# Patient Record
Sex: MALE | Race: WHITE | Employment: OTHER | URBAN - NONMETROPOLITAN AREA
[De-identification: names, ages, dates, MRNs, and addresses within clinical notes are randomized per-mention and may not be internally consistent; named-entity substitution may affect disease eponyms.]

---

## 2017-01-02 ENCOUNTER — OFFICE VISIT (OUTPATIENT)
Dept: PRIMARY CARE CLINIC | Age: 42
End: 2017-01-02

## 2017-01-02 VITALS
DIASTOLIC BLOOD PRESSURE: 96 MMHG | RESPIRATION RATE: 16 BRPM | BODY MASS INDEX: 30.03 KG/M2 | HEART RATE: 65 BPM | OXYGEN SATURATION: 100 % | WEIGHT: 234 LBS | SYSTOLIC BLOOD PRESSURE: 122 MMHG | HEIGHT: 74 IN | TEMPERATURE: 97.3 F

## 2017-01-02 DIAGNOSIS — H81.10 BPPV (BENIGN PAROXYSMAL POSITIONAL VERTIGO), UNSPECIFIED LATERALITY: Primary | ICD-10-CM

## 2017-01-02 PROCEDURE — 99213 OFFICE O/P EST LOW 20 MIN: CPT | Performed by: FAMILY MEDICINE

## 2017-01-02 RX ORDER — MECLIZINE HYDROCHLORIDE 25 MG/1
25 TABLET ORAL 3 TIMES DAILY PRN
Qty: 30 TABLET | Refills: 3 | Status: SHIPPED | OUTPATIENT
Start: 2017-01-02

## 2017-01-02 ASSESSMENT — ENCOUNTER SYMPTOMS
NAUSEA: 1
EYES NEGATIVE: 1
RESPIRATORY NEGATIVE: 1
ALLERGIC/IMMUNOLOGIC NEGATIVE: 1

## 2020-10-26 ENCOUNTER — OFFICE VISIT (OUTPATIENT)
Dept: PRIMARY CARE CLINIC | Age: 45
End: 2020-10-26
Payer: COMMERCIAL

## 2020-10-26 VITALS
DIASTOLIC BLOOD PRESSURE: 82 MMHG | HEART RATE: 74 BPM | BODY MASS INDEX: 31.95 KG/M2 | WEIGHT: 249 LBS | TEMPERATURE: 97.2 F | HEIGHT: 74 IN | SYSTOLIC BLOOD PRESSURE: 128 MMHG | OXYGEN SATURATION: 97 %

## 2020-10-26 PROCEDURE — 99202 OFFICE O/P NEW SF 15 MIN: CPT | Performed by: PHYSICIAN ASSISTANT

## 2020-10-26 RX ORDER — AMOXICILLIN 500 MG/1
500 CAPSULE ORAL 3 TIMES DAILY
Qty: 30 CAPSULE | Refills: 0 | Status: SHIPPED | OUTPATIENT
Start: 2020-10-26 | End: 2020-11-05

## 2020-10-26 ASSESSMENT — ENCOUNTER SYMPTOMS
COUGH: 0
RHINORRHEA: 0
SORE THROAT: 0

## 2020-10-26 ASSESSMENT — PATIENT HEALTH QUESTIONNAIRE - PHQ9
SUM OF ALL RESPONSES TO PHQ9 QUESTIONS 1 & 2: 0
1. LITTLE INTEREST OR PLEASURE IN DOING THINGS: 0
SUM OF ALL RESPONSES TO PHQ QUESTIONS 1-9: 0
2. FEELING DOWN, DEPRESSED OR HOPELESS: 0

## 2020-10-26 NOTE — PROGRESS NOTES
Subjective:      Patient ID: Edith Grider is a 41 y.o. male. Otalgia    There is pain in both ears. This is a new problem. The current episode started in the past 7 days. The problem has been gradually worsening. There has been no fever. Associated symptoms include hearing loss. Pertinent negatives include no coughing, ear discharge, rhinorrhea or sore throat. He has tried nothing for the symptoms. There is no history of a chronic ear infection. Review of Systems   Constitutional: Negative. HENT: Positive for ear pain and hearing loss. Negative for ear discharge, rhinorrhea and sore throat. Respiratory: Negative for cough. Cardiovascular: Negative. Objective:   Physical Exam  HENT:      Head: Normocephalic. Right Ear: Drainage present. Tympanic membrane is retracted. Left Ear: Drainage and swelling present. Tympanic membrane is retracted. Mouth/Throat:      Mouth: Mucous membranes are moist.   Eyes:      Pupils: Pupils are equal, round, and reactive to light. Neck:      Musculoskeletal: Neck supple. Cardiovascular:      Rate and Rhythm: Normal rate. Pulmonary:      Effort: Pulmonary effort is normal.      Breath sounds: Normal breath sounds. Neurological:      General: No focal deficit present. Mental Status: He is alert and oriented to person, place, and time. Psychiatric:         Mood and Affect: Mood normal.         Assessment:      1. Non-recurrent acute suppurative otitis media of both ears without spontaneous rupture of tympanic membranes    2. Acute otitis externa of both ears, unspecified type          Plan:      Amoxil 500 mg tid x 10 days. Cortisporin otic gtts. Supportive care advised. Tylenol/Motrin. Keep ears clean and dry. Follow-up PRN/as planned with PCP.         JF Guzman

## 2020-11-07 ENCOUNTER — TELEPHONE (OUTPATIENT)
Dept: PRIMARY CARE CLINIC | Age: 45
End: 2020-11-07

## 2020-11-07 RX ORDER — SULFAMETHOXAZOLE AND TRIMETHOPRIM 800; 160 MG/1; MG/1
1 TABLET ORAL 2 TIMES DAILY
Qty: 20 TABLET | Refills: 0 | Status: SHIPPED | OUTPATIENT
Start: 2020-11-07 | End: 2020-11-12 | Stop reason: ALTCHOICE

## 2020-11-07 RX ORDER — CIPROFLOXACIN HYDROCHLORIDE 3.5 MG/ML
SOLUTION/ DROPS TOPICAL
Qty: 10 ML | Refills: 0 | Status: SHIPPED | OUTPATIENT
Start: 2020-11-07 | End: 2020-11-12 | Stop reason: ALTCHOICE

## 2020-11-12 ENCOUNTER — OFFICE VISIT (OUTPATIENT)
Dept: PRIMARY CARE CLINIC | Age: 45
End: 2020-11-12
Payer: COMMERCIAL

## 2020-11-12 VITALS
OXYGEN SATURATION: 97 % | SYSTOLIC BLOOD PRESSURE: 120 MMHG | HEART RATE: 72 BPM | DIASTOLIC BLOOD PRESSURE: 86 MMHG | TEMPERATURE: 97.1 F | BODY MASS INDEX: 32.23 KG/M2 | WEIGHT: 251 LBS

## 2020-11-12 PROCEDURE — 99213 OFFICE O/P EST LOW 20 MIN: CPT | Performed by: NURSE PRACTITIONER

## 2020-11-12 RX ORDER — CEFDINIR 300 MG/1
300 CAPSULE ORAL 2 TIMES DAILY
Qty: 20 CAPSULE | Refills: 0 | Status: SHIPPED | OUTPATIENT
Start: 2020-11-12 | End: 2020-11-22

## 2020-11-12 RX ORDER — OFLOXACIN 3 MG/ML
5 SOLUTION AURICULAR (OTIC) 2 TIMES DAILY
Qty: 5 ML | Refills: 0 | Status: SHIPPED | OUTPATIENT
Start: 2020-11-12 | End: 2021-01-15 | Stop reason: SDUPTHER

## 2020-11-12 ASSESSMENT — ENCOUNTER SYMPTOMS
NAUSEA: 0
RHINORRHEA: 1
SHORTNESS OF BREATH: 0
VOMITING: 0
COUGH: 0
DIARRHEA: 0

## 2020-11-12 NOTE — PATIENT INSTRUCTIONS
Start antibiotics and ear drops today. If not improving, please return to clinic or follow up with PCP for further evaluation which may include ENT referral.    Patient Education        Ear Infection (Otitis Media): Care Instructions  Overview     An ear infection may start with a cold and affect the middle ear (otitis media). It can hurt a lot. Most ear infections clear up on their own in a couple of days and do not need antibiotics. Also, antibiotics do not work against viruses, which may be the cause of your infection. Regular doses of pain relievers are the best way to reduce your fever and help you feel better. Follow-up care is a key part of your treatment and safety. Be sure to make and go to all appointments, and call your doctor if you are having problems. It's also a good idea to know your test results and keep a list of the medicines you take. How can you care for yourself at home? · Take pain medicines exactly as directed. ? If the doctor gave you a prescription medicine for pain, take it as prescribed. ? If you are not taking a prescription pain medicine, take an over-the-counter medicine, such as acetaminophen (Tylenol), ibuprofen (Advil, Motrin), or naproxen (Aleve). Read and follow all instructions on the label. ? Do not take two or more pain medicines at the same time unless the doctor told you to. Many pain medicines have acetaminophen, which is Tylenol. Too much acetaminophen (Tylenol) can be harmful. · Plan to take a full dose of pain reliever before bedtime. Getting enough sleep will help you get better. · Try a warm, moist washcloth on the ear. It may help relieve pain. · If your doctor prescribed antibiotics, take them as directed. Do not stop taking them just because you feel better. You need to take the full course of antibiotics. When should you call for help?    Call your doctor now or seek immediate medical care if:    · You have new or increasing ear pain.     · You have new or increasing pus or blood draining from your ear.     · You have a fever with a stiff neck or a severe headache. Watch closely for changes in your health, and be sure to contact your doctor if:    · You have new or worse symptoms.     · You are not getting better after taking an antibiotic for 2 days. Where can you learn more? Go to https://chpezakia.LightningBuy. org and sign in to your Snaptalent account. Enter J617 in the Magick.nu box to learn more about \"Ear Infection (Otitis Media): Care Instructions. \"     If you do not have an account, please click on the \"Sign Up Now\" link. Current as of: April 15, 2020               Content Version: 12.6  © 2006-2020 [employer]. Care instructions adapted under license by [facility] ([facility]). If you have questions about a medical condition or this instruction, always ask your healthcare professional. [place] [address] any warranty or liability for your use of this information. Patient Education        Swimmer's Ear: Care Instructions  Your Care Instructions     Swimmer's ear (otitis externa) is inflammation or infection of the ear canal. This is the passage that leads from the outer ear to the eardrum. Any water, sand, or other debris that gets into the ear canal and stays there can cause swimmer's ear. Putting cotton swabs or other items in the ear to clean it can also cause this problem. Swimmer's ear can be very painful. But you can treat the pain and infection with medicines. You should feel better in a few days. Follow-up care is a key part of your treatment and safety. Be sure to make and go to all appointments, and call your doctor if you are having problems. It's also a good idea to know your test results and keep a list of the medicines you take. How can you care for yourself at home? Cleaning and care  · Use antibiotic drops as your doctor directs.   · Do not insert ear drops (other than the antibiotic ear under license by [facility] ([facility]). If you have questions about a medical condition or this instruction, always ask your healthcare professional. [address] any warranty or liability for your use of this information.

## 2020-11-12 NOTE — PROGRESS NOTES
[address]  [address]  Dept: [phone]  Dept Fax: [phone]: Ronit       Chief Complaint   Patient presents with    Otalgia     L ear cont despite abx. Nurses Notes reviewed and I agree except as noted in the HPI. HISTORY OF PRESENT ILLNESS   Gracie Foster is a 41 y.o. male who presents to [facility] today (11/13/2020) for evaluation of:   Pt here for left ear pain that has been present for about 7 days. Pt was previously treated for AOM on 10/26 and states he felt better for a short time but symptoms have returned. Pt did call in to office on 11/7/20 but states he did not receive a call back. Pt denies any drainage in left ear, states pain is intermittent, but can not hear at all. Pt denies any chronic ear infection hx, denies hearing issues in the past.    Otalgia    There is pain in the left ear. This is a recurrent problem. The current episode started in the past 7 days. The problem occurs every few hours. The problem has been unchanged. There has been no fever. The pain is at a severity of 2/10. Associated symptoms include rhinorrhea. Pertinent negatives include no coughing, diarrhea, ear discharge, headaches or vomiting. Associated symptoms comments: Decreased hearing in left ear. He has tried antibiotics for the symptoms. The treatment provided mild relief. There is no history of a chronic ear infection, hearing loss or a tympanostomy tube. REVIEW OF SYSTEMS     Review of Systems   Constitutional: Negative for chills and fever. HENT: Positive for ear pain (left occasional) and rhinorrhea. Negative for ear discharge and tinnitus. Decreased hearing in left ear   Respiratory: Negative for cough and shortness of breath. Cardiovascular: Negative for chest pain.    Gastrointestinal: Negative for diarrhea, problems. It's also a good idea to know your test results and keep a list of the medicines you take. How can you care for yourself at home? · Take pain medicines exactly as directed. ? If the doctor gave you a prescription medicine for pain, take it as prescribed. ? If you are not taking a prescription pain medicine, take an over-the-counter medicine, such as acetaminophen (Tylenol), ibuprofen (Advil, Motrin), or naproxen (Aleve). Read and follow all instructions on the label. ? Do not take two or more pain medicines at the same time unless the doctor told you to. Many pain medicines have acetaminophen, which is Tylenol. Too much acetaminophen (Tylenol) can be harmful. · Plan to take a full dose of pain reliever before bedtime. Getting enough sleep will help you get better. · Try a warm, moist washcloth on the ear. It may help relieve pain. · If your doctor prescribed antibiotics, take them as directed. Do not stop taking them just because you feel better. You need to take the full course of antibiotics. When should you call for help? Call your doctor now or seek immediate medical care if:    · You have new or increasing ear pain.     · You have new or increasing pus or blood draining from your ear.     · You have a fever with a stiff neck or a severe headache. Watch closely for changes in your health, and be sure to contact your doctor if:    · You have new or worse symptoms.     · You are not getting better after taking an antibiotic for 2 days. Where can you learn more? Go to https://Dynamic Organic LightlivierPV Nano Cell.X[employer] Advanced [employer]. org and sign in to your Electronifie account. Enter F932 in the Transparency Software box to learn more about \"Ear Infection (Otitis Media): Care Instructions. \"     If you do not have an account, please click on the \"Sign Up Now\" link. Current as of: April 15, 2020               Content Version: 12.6  © 2893-6813 [employer].    Care instructions adapted under license by [facility]. If you have questions about a medical condition or this instruction, always ask your healthcare professional. [place] [address] any warranty or liability for your use of this information. Patient Education        Swimmer's Ear: Care Instructions  Your Care Instructions     Swimmer's ear (otitis externa) is inflammation or infection of the ear canal. This is the passage that leads from the outer ear to the eardrum. Any water, sand, or other debris that gets into the ear canal and stays there can cause swimmer's ear. Putting cotton swabs or other items in the ear to clean it can also cause this problem. Swimmer's ear can be very painful. But you can treat the pain and infection with medicines. You should feel better in a few days. Follow-up care is a key part of your treatment and safety. Be sure to make and go to all appointments, and call your doctor if you are having problems. It's also a good idea to know your test results and keep a list of the medicines you take. How can you care for yourself at home? Cleaning and care  · Use antibiotic drops as your doctor directs. · Do not insert ear drops (other than the antibiotic ear drops) or anything else into the ear unless your doctor has told you to. · Avoid getting water in the ear until the problem clears up. Use cotton lightly coated with petroleum jelly as an earplug. Do not use plastic earplugs. · Use a hair dryer set on low to carefully dry the ear after you shower. · To ease ear pain, hold a warm washcloth against your ear. · Take pain medicines exactly as directed. ? If the doctor gave you a prescription medicine for pain, take it as prescribed. ? If you are not taking a prescription pain medicine, ask your doctor if you can take an over-the-counter medicine. Inserting ear drops  · Warm the drops to body temperature by rolling the container in your hands.  Or you can place it in a cup of warm water for a few

## 2021-01-15 ENCOUNTER — OFFICE VISIT (OUTPATIENT)
Dept: PRIMARY CARE CLINIC | Age: 46
End: 2021-01-15
Payer: COMMERCIAL

## 2021-01-15 VITALS
SYSTOLIC BLOOD PRESSURE: 120 MMHG | RESPIRATION RATE: 18 BRPM | TEMPERATURE: 97.7 F | HEART RATE: 80 BPM | BODY MASS INDEX: 31.57 KG/M2 | HEIGHT: 74 IN | DIASTOLIC BLOOD PRESSURE: 84 MMHG | WEIGHT: 246 LBS | OXYGEN SATURATION: 96 %

## 2021-01-15 DIAGNOSIS — H60.63 CHRONIC OTITIS EXTERNA OF BOTH EARS, UNSPECIFIED TYPE: Primary | ICD-10-CM

## 2021-01-15 PROCEDURE — G8417 CALC BMI ABV UP PARAM F/U: HCPCS | Performed by: FAMILY MEDICINE

## 2021-01-15 PROCEDURE — G8427 DOCREV CUR MEDS BY ELIG CLIN: HCPCS | Performed by: FAMILY MEDICINE

## 2021-01-15 PROCEDURE — 1036F TOBACCO NON-USER: CPT | Performed by: FAMILY MEDICINE

## 2021-01-15 PROCEDURE — G8484 FLU IMMUNIZE NO ADMIN: HCPCS | Performed by: FAMILY MEDICINE

## 2021-01-15 PROCEDURE — 99203 OFFICE O/P NEW LOW 30 MIN: CPT | Performed by: FAMILY MEDICINE

## 2021-01-15 RX ORDER — PREDNISONE 20 MG/1
20 TABLET ORAL 2 TIMES DAILY
Qty: 10 TABLET | Refills: 0 | Status: SHIPPED | OUTPATIENT
Start: 2021-01-15 | End: 2021-01-20

## 2021-01-15 RX ORDER — CIPROFLOXACIN 500 MG/1
500 TABLET, FILM COATED ORAL 2 TIMES DAILY
Qty: 20 TABLET | Refills: 0 | Status: SHIPPED | OUTPATIENT
Start: 2021-01-15 | End: 2021-01-21 | Stop reason: ALTCHOICE

## 2021-01-15 RX ORDER — OFLOXACIN 3 MG/ML
5 SOLUTION AURICULAR (OTIC) 2 TIMES DAILY
Qty: 5 ML | Refills: 0 | Status: SHIPPED | OUTPATIENT
Start: 2021-01-15 | End: 2021-01-25

## 2021-01-15 ASSESSMENT — PATIENT HEALTH QUESTIONNAIRE - PHQ9
SUM OF ALL RESPONSES TO PHQ9 QUESTIONS 1 & 2: 0
SUM OF ALL RESPONSES TO PHQ QUESTIONS 1-9: 0
1. LITTLE INTEREST OR PLEASURE IN DOING THINGS: 0

## 2021-01-15 ASSESSMENT — ENCOUNTER SYMPTOMS
RESPIRATORY NEGATIVE: 1
EYES NEGATIVE: 1
SORE THROAT: 0
RHINORRHEA: 0
GASTROINTESTINAL NEGATIVE: 1

## 2021-01-15 NOTE — PROGRESS NOTES
1/15/2021     Jay Martino (:  1975) is a 41 y.o. male, here for evaluation of the following medical concerns:    Otalgia   There is pain in the left ear. This is a new problem. The current episode started more than 1 month ago (has had ongoing issues with ear pain since October. Has been treated twice since that time for ear infection, but just not cleared. ). The problem occurs constantly. The problem has been waxing and waning. There has been no fever. Associated symptoms include hearing loss. Pertinent negatives include no ear discharge, headaches, rhinorrhea or sore throat. Treatments tried: antibiotic (amoxicillin, omnicef) and ear drops. The treatment provided mild relief. Did review patient's med list, allergies, social history,pmhx and pshx today as noted in the record. Review of Systems   Constitutional: Negative for chills, fatigue and fever. HENT: Positive for ear pain and hearing loss. Negative for congestion, ear discharge, rhinorrhea and sore throat. Eyes: Negative. Respiratory: Negative. Cardiovascular: Negative. Gastrointestinal: Negative. Musculoskeletal: Negative. Skin: Negative. Neurological: Negative for headaches. Prior to Visit Medications    Medication Sig Taking? Authorizing Provider   meclizine (ANTIVERT) 25 MG tablet Take 1 tablet by mouth 3 times daily as needed for Dizziness Yes Nina Allen MD   escitalopram (LEXAPRO) 20 MG tablet Take 20 mg by mouth daily  Yes Historical Provider, MD   mirtazapine (REMERON) 15 MG tablet Take 15 mg by mouth nightly  Yes Historical Provider, MD   ALPRAZolam Amarilys Sosa) 0.5 MG tablet Take 1 tablet by mouth every 8 hours as needed for Anxiety Yes Que Arredondo MD        Social History     Tobacco Use    Smoking status: Never Smoker    Smokeless tobacco: Never Used   Substance Use Topics    Alcohol use:  Yes     Alcohol/week: 2.0 standard drinks     Types: 2 Cans of beer per week        Vitals: 01/15/21 1310   BP: 120/84   Site: Right Upper Arm   Position: Sitting   Cuff Size: Large Adult   Pulse: 80   Resp: 18   Temp: 97.7 °F (36.5 °C)   TempSrc: Tympanic   SpO2: 96%   Weight: 246 lb (111.6 kg)   Height: 6' 2\" (1.88 m)     Estimated body mass index is 31.58 kg/m² as calculated from the following:    Height as of this encounter: 6' 2\" (1.88 m). Weight as of this encounter: 246 lb (111.6 kg). Physical Exam  Vitals signs and nursing note reviewed. Constitutional:       General: He is not in acute distress. Appearance: He is well-developed. He is not diaphoretic. HENT:      Head: Normocephalic and atraumatic. Ears:      Comments: Bilateral ear canals with erythema and exudate noted. There is swelling noted to bilateral ear canals. There is no tenderness with palpation to the ear canals or outer ear. Nose: Nose normal. No congestion or rhinorrhea. Mouth/Throat:      Mouth: Mucous membranes are moist.      Pharynx: Oropharynx is clear. No oropharyngeal exudate or posterior oropharyngeal erythema. Eyes:      Conjunctiva/sclera: Conjunctivae normal.   Neck:      Musculoskeletal: Normal range of motion. Pulmonary:      Effort: Pulmonary effort is normal.   Skin:     General: Skin is warm and dry. Coloration: Skin is not pale. Findings: No erythema or rash. Neurological:      Mental Status: He is alert and oriented to person, place, and time. Psychiatric:         Behavior: Behavior normal.         Thought Content: Thought content normal.         Judgment: Judgment normal.         ASSESSMENT/PLAN:    Encounter Diagnosis   Name Primary?     Chronic otitis externa of both ears, unspecified type Yes     Orders Placed This Encounter   Medications    ciprofloxacin (CIPRO) 500 MG tablet     Sig: Take 1 tablet by mouth 2 times daily for 10 days     Dispense:  20 tablet     Refill:  0    predniSONE (DELTASONE) 20 MG tablet Sig: Take 1 tablet by mouth 2 times daily for 5 days     Dispense:  10 tablet     Refill:  0    ofloxacin (FLOXIN) 0.3 % otic solution     Sig: Place 5 drops into both ears 2 times daily for 10 days     Dispense:  5 mL     Refill:  0     Orders Placed This Encounter   Procedures   Yoel Dick MD, Otolaryngology, [place]     Referral Priority:   Routine     Referral Type:   Eval and Treat     Referral Reason:   Specialty Services Required     Referred to Provider:   Amauri Wynn MD     Requested Specialty:   Otolaryngology     Number of Visits Requested:   1     Patient has been treated 2 times in last 3 months with no relief. Will treat with cipro as this appears to be a chronic otitis externa. Will also give short course of prednisone to help with inflammation and swelling. Did also give topical ear drops. Think as patient hasn't seen improvement with treatment in last 2 courses of treatment he should see ENT for opinion. Return  if no improvement in symptoms or if any further symptoms arise. No follow-ups on file. An electronic signature was used to authenticate this note.     --Tamika Pelaez DO on 1/15/2021 at 1:31 PM

## 2021-01-21 ENCOUNTER — OFFICE VISIT (OUTPATIENT)
Dept: OTOLARYNGOLOGY | Age: 46
End: 2021-01-21
Payer: COMMERCIAL

## 2021-01-21 VITALS
HEIGHT: 74 IN | WEIGHT: 245.4 LBS | SYSTOLIC BLOOD PRESSURE: 134 MMHG | DIASTOLIC BLOOD PRESSURE: 82 MMHG | BODY MASS INDEX: 31.49 KG/M2

## 2021-01-21 DIAGNOSIS — H61.23 BILATERAL IMPACTED CERUMEN: Primary | ICD-10-CM

## 2021-01-21 PROCEDURE — 69210 REMOVE IMPACTED EAR WAX UNI: CPT | Performed by: OTOLARYNGOLOGY

## 2021-01-21 PROCEDURE — 1036F TOBACCO NON-USER: CPT | Performed by: OTOLARYNGOLOGY

## 2021-01-21 PROCEDURE — G8417 CALC BMI ABV UP PARAM F/U: HCPCS | Performed by: OTOLARYNGOLOGY

## 2021-01-21 PROCEDURE — G8484 FLU IMMUNIZE NO ADMIN: HCPCS | Performed by: OTOLARYNGOLOGY

## 2021-01-21 PROCEDURE — G8427 DOCREV CUR MEDS BY ELIG CLIN: HCPCS | Performed by: OTOLARYNGOLOGY

## 2021-01-21 PROCEDURE — 99203 OFFICE O/P NEW LOW 30 MIN: CPT | Performed by: OTOLARYNGOLOGY

## 2021-01-21 NOTE — PROGRESS NOTES
2021 4:08 PM MITESH Cardona (:  1975) is a 41 y.o. male,New patient, here for evaluation of the following chief complaint(s):  New Patient (referred from Dr. Antonio Mcgee for chronic otitis externa) and Otitis Externa (bilateral ears, started 2020 and has not gone away after three trials of antibiotics)      ASSESSMENT/PLAN:  1. Bilateral impacted cerumen      1. Bilateral impacted cerumen    Continue Floxin otic drops for 3 days  Complete oral antibiotic and steroid dosing as prescribed  Patient was asked to call the ENT office next week if the clogged feeling does not subside and we will order an audiogram    Return if symptoms worsen or fail to improve. SUBJECTIVE/OBJECTIVE:  HPI  40yo man with h/o chronic bilateral otitis externa. Patient states that this issue happened at the end of 2020. He has been treated with Cortisporin otic drops, amoxicillin, ciprofloxacin otic drops, Omnicef, prednisone, oral ciprofloxacin, Floxin otic drops. He states that overall the pain is improved and resolved. He does not have any otorrhea. But his ear still feel clogged bilaterally. States this is never happened before. Uses Q-tips regularly. Review of Systems  ENT ROS: positive for - hearing change    General: The patient is found to be alert and normally responsive male with grossly normal hearing, clear voice and normal articulation. Communication is without difficulty. Voice: Clear   Skin: The skin has normal colour and turgor. Face: The facial contour is symmetric at rest and with movement. Ears: The pinnae have normal contours.     AD: EAC impacted with cerumen and exudate, TM intact, thickened no effusion/erythema/retraction   AS: EAC impacted with cerumen and exudate, TM intact, thickened, no effusion/erythema/retraction Eye: The ocular movements are full and symmetric, the conjunctiva is unremarkable; sclera are anicteric, pupillary response is symmetric. No nystagmus is found. An electronic signature was used to authenticate this note.     --Joshua Delvalle MD     1/21/2021 4:08 PM EST

## 2022-10-31 ENCOUNTER — TELEPHONE (OUTPATIENT)
Dept: OTOLARYNGOLOGY | Age: 47
End: 2022-10-31

## 2022-10-31 NOTE — TELEPHONE ENCOUNTER
Patient called requesting an appointment with Dr Homar Preston. He states he has seen Dr Homar Preston in the past and his ears are plugged again and he can hardly hear.   Call him back [phone]

## 2022-11-17 ENCOUNTER — OFFICE VISIT (OUTPATIENT)
Dept: OTOLARYNGOLOGY | Age: 47
End: 2022-11-17
Payer: COMMERCIAL

## 2022-11-17 VITALS
WEIGHT: 228.8 LBS | HEART RATE: 74 BPM | HEIGHT: 74 IN | RESPIRATION RATE: 14 BRPM | BODY MASS INDEX: 29.36 KG/M2 | DIASTOLIC BLOOD PRESSURE: 88 MMHG | SYSTOLIC BLOOD PRESSURE: 134 MMHG | OXYGEN SATURATION: 96 %

## 2022-11-17 DIAGNOSIS — H61.23 BILATERAL IMPACTED CERUMEN: Primary | ICD-10-CM

## 2022-11-17 DIAGNOSIS — H93.293 ABNORMAL AUDITORY PERCEPTION OF BOTH EARS: ICD-10-CM

## 2022-11-17 PROCEDURE — G8419 CALC BMI OUT NRM PARAM NOF/U: HCPCS | Performed by: OTOLARYNGOLOGY

## 2022-11-17 PROCEDURE — G8484 FLU IMMUNIZE NO ADMIN: HCPCS | Performed by: OTOLARYNGOLOGY

## 2022-11-17 PROCEDURE — 99213 OFFICE O/P EST LOW 20 MIN: CPT | Performed by: OTOLARYNGOLOGY

## 2022-11-17 PROCEDURE — 1036F TOBACCO NON-USER: CPT | Performed by: OTOLARYNGOLOGY

## 2022-11-17 PROCEDURE — G8427 DOCREV CUR MEDS BY ELIG CLIN: HCPCS | Performed by: OTOLARYNGOLOGY

## 2022-11-17 RX ORDER — CLONAZEPAM 0.5 MG/1
TABLET ORAL
COMMUNITY
Start: 2022-11-09

## 2022-11-17 RX ORDER — ESCITALOPRAM OXALATE 10 MG/1
TABLET ORAL
COMMUNITY
Start: 2022-08-13

## 2022-11-17 RX ORDER — DESVENLAFAXINE 50 MG/1
TABLET, EXTENDED RELEASE ORAL
COMMUNITY
Start: 2022-10-24

## 2022-11-17 NOTE — PROGRESS NOTES
2022 4:08 PM MITESH Solis (:  1975) is a 56 y.o. male,New patient, here for evaluation of the following chief complaint(s):  Ear Problem (Bilateral ears feel plugged for several months)      ASSESSMENT/PLAN:  1. Bilateral impacted cerumen    2. Abnormal auditory perception of both ears      1. Bilateral impacted cerumen  2. Abnormal auditory perception of both ears    Cerumen impacted adjacent to TM s cleared  Call ent office for audio if no improvement, would then recommend formal audiogram  Consider flonase     No follow-ups on file. SUBJECTIVE/OBJECTIVE:  HPI  40yo man with h/o chronic bilateral otitis externa. Patient states that this issue happened at the end of 2020. He has been treated with Cortisporin otic drops, amoxicillin, ciprofloxacin otic drops, Omnicef, prednisone, oral ciprofloxacin, Floxin otic drops. He states that overall the pain is improved and resolved. He does not have any otorrhea. But his ear still feel clogged bilaterally. States this is never happened before. Uses Q-tips regularly. Follows up today about 1 year later with bilateral plugged ears. He denies pain and drainage. Review of Systems  ENT ROS: positive for - hearing change    General: The patient is found to be alert and normally responsive male with grossly normal hearing, clear voice and normal articulation. Communication is without difficulty. Voice: Clear   Skin: The skin has normal colour and turgor. Face: The facial contour is symmetric at rest and with movement. Ears: The pinnae have normal contours.     AD: EAC impacted with cerumen adjacent to tm, TM intact, thickened no effusion/erythema/retraction   AS: EAC impacted with cerumen adjacent to tm, TM intact, thickened, no effusion/erythema/retraction  AU: irrigated with h202 and suction #5 cleared debris and cerumen    Eye: The ocular movements are full and symmetric, the conjunctiva is unremarkable; sclera are anicteric, pupillary response is symmetric. No nystagmus is found. An electronic signature was used to authenticate this note.     --Nito Bentley MD     11/17/2022 4:08 PM EST

## 2024-02-06 ENCOUNTER — OFFICE VISIT (OUTPATIENT)
Dept: PRIMARY CARE CLINIC | Age: 49
End: 2024-02-06
Payer: COMMERCIAL

## 2024-02-06 VITALS
TEMPERATURE: 97.8 F | HEIGHT: 74 IN | SYSTOLIC BLOOD PRESSURE: 120 MMHG | BODY MASS INDEX: 30.67 KG/M2 | DIASTOLIC BLOOD PRESSURE: 88 MMHG | OXYGEN SATURATION: 97 % | HEART RATE: 91 BPM | WEIGHT: 239 LBS

## 2024-02-06 DIAGNOSIS — H66.91 RIGHT OTITIS MEDIA, UNSPECIFIED OTITIS MEDIA TYPE: Primary | ICD-10-CM

## 2024-02-06 DIAGNOSIS — J40 BRONCHITIS: ICD-10-CM

## 2024-02-06 PROCEDURE — 99203 OFFICE O/P NEW LOW 30 MIN: CPT | Performed by: FAMILY MEDICINE

## 2024-02-06 PROCEDURE — 1036F TOBACCO NON-USER: CPT | Performed by: FAMILY MEDICINE

## 2024-02-06 PROCEDURE — G8427 DOCREV CUR MEDS BY ELIG CLIN: HCPCS | Performed by: FAMILY MEDICINE

## 2024-02-06 PROCEDURE — G8417 CALC BMI ABV UP PARAM F/U: HCPCS | Performed by: FAMILY MEDICINE

## 2024-02-06 PROCEDURE — G8484 FLU IMMUNIZE NO ADMIN: HCPCS | Performed by: FAMILY MEDICINE

## 2024-02-06 RX ORDER — AZITHROMYCIN 250 MG/1
TABLET, FILM COATED ORAL
Qty: 1 PACKET | Refills: 0 | Status: SHIPPED | OUTPATIENT
Start: 2024-02-06 | End: 2024-02-10

## 2024-02-06 NOTE — PROGRESS NOTES
[facility]             [address] [address]                        Telephone [phone]             Fax [phone]       Justen Monsivais  :  1975  Age:  48 y.o.   MRN:  [id number]  Date of visit:  2024       Assessment & Plan:    1. Right otitis media, unspecified otitis media type  2. Bronchitis  - azithromycin (ZITHROMAX) 250 MG tablet; Take 2 po Day 1, then 1 po Daily Days 2-5.  Take with food.  Dispense: 1 packet; Refill: 0    He was advised to follow up if symptoms worsen or do not resolve.         Subjective:    Justen Monsivais is a 48 y.o. male who presents to [facility] today (2024) for evaluation of:  Congestion (HA,loss of voice. Fever over the weekend. Sx began wed. Fevers has since subsided. )         He states that he had cough and fever approximately 5-6 days ago.   He no longer has the fever, but he reports pressure in his ears and chest.  He states that the cough is not productive.       Current medications are:  Current Outpatient Medications   Medication Sig Dispense Refill    desvenlafaxine succinate (PRISTIQ) 50 MG TB24 extended release tablet take 1 tablet by mouth once daily      clonazePAM (KLONOPIN) 0.5 MG tablet take 1 tablet by mouth twice a day      mirtazapine (REMERON) 15 MG tablet Take 1 tablet by mouth nightly  0    ALPRAZolam (XANAX) 0.5 MG tablet Take 1 tablet by mouth every 8 hours as needed for Anxiety 30 tablet 0     No current facility-administered medications for this visit.       He has No Known Allergies.    He has the following problem list:  Patient Active Problem List   Diagnosis    Anxiety and depression        He  reports that he has never smoked. He has never used smokeless tobacco.      Objective:    Vitals:    24 1432   BP: 120/88   Pulse: 91   Temp: 97.8 °F (36.6 °C)   TempSrc: Tympanic   SpO2: 97%   Weight: 108.4 kg (239 lb)   Height: 1.88 m (6'